# Patient Record
Sex: FEMALE | Race: ASIAN | NOT HISPANIC OR LATINO | URBAN - METROPOLITAN AREA
[De-identification: names, ages, dates, MRNs, and addresses within clinical notes are randomized per-mention and may not be internally consistent; named-entity substitution may affect disease eponyms.]

---

## 2017-09-24 ENCOUNTER — EMERGENCY (EMERGENCY)
Facility: HOSPITAL | Age: 37
LOS: 1 days | Discharge: HOME | End: 2017-09-24
Attending: EMERGENCY MEDICINE | Admitting: EMERGENCY MEDICINE
Payer: SELF-PAY

## 2017-09-24 VITALS
OXYGEN SATURATION: 100 % | HEART RATE: 64 BPM | DIASTOLIC BLOOD PRESSURE: 76 MMHG | RESPIRATION RATE: 16 BRPM | SYSTOLIC BLOOD PRESSURE: 107 MMHG

## 2017-09-24 LAB
ALBUMIN SERPL ELPH-MCNC: 4.3 G/DL — SIGNIFICANT CHANGE UP (ref 3.3–5)
ALP SERPL-CCNC: 32 U/L — LOW (ref 40–120)
ALT FLD-CCNC: 18 U/L — SIGNIFICANT CHANGE UP (ref 4–33)
AST SERPL-CCNC: 37 U/L — HIGH (ref 4–32)
BASOPHILS # BLD AUTO: 0.05 K/UL — SIGNIFICANT CHANGE UP (ref 0–0.2)
BASOPHILS NFR BLD AUTO: 0.8 % — SIGNIFICANT CHANGE UP (ref 0–2)
BASOPHILS NFR SPEC: 0 % — SIGNIFICANT CHANGE UP (ref 0–2)
BILIRUB SERPL-MCNC: 0.7 MG/DL — SIGNIFICANT CHANGE UP (ref 0.2–1.2)
BLASTS # FLD: 0 % — SIGNIFICANT CHANGE UP (ref 0–0)
BUN SERPL-MCNC: 7 MG/DL — SIGNIFICANT CHANGE UP (ref 7–23)
CALCIUM SERPL-MCNC: 9.1 MG/DL — SIGNIFICANT CHANGE UP (ref 8.4–10.5)
CHLORIDE SERPL-SCNC: 102 MMOL/L — SIGNIFICANT CHANGE UP (ref 98–107)
CO2 SERPL-SCNC: 21 MMOL/L — LOW (ref 22–31)
CREAT SERPL-MCNC: 0.69 MG/DL — SIGNIFICANT CHANGE UP (ref 0.5–1.3)
EOSINOPHIL # BLD AUTO: 0.15 K/UL — SIGNIFICANT CHANGE UP (ref 0–0.5)
EOSINOPHIL NFR BLD AUTO: 2.4 % — SIGNIFICANT CHANGE UP (ref 0–6)
EOSINOPHIL NFR FLD: 1.8 % — SIGNIFICANT CHANGE UP (ref 0–6)
GIANT PLATELETS BLD QL SMEAR: PRESENT — SIGNIFICANT CHANGE UP
GLUCOSE SERPL-MCNC: 82 MG/DL — SIGNIFICANT CHANGE UP (ref 70–99)
HCT VFR BLD CALC: 40.8 % — SIGNIFICANT CHANGE UP (ref 34.5–45)
HGB BLD-MCNC: 13.1 G/DL — SIGNIFICANT CHANGE UP (ref 11.5–15.5)
HYPOCHROMIA BLD QL: SLIGHT — SIGNIFICANT CHANGE UP
IMM GRANULOCYTES # BLD AUTO: 0.01 # — SIGNIFICANT CHANGE UP
IMM GRANULOCYTES NFR BLD AUTO: 0.2 % — SIGNIFICANT CHANGE UP (ref 0–1.5)
LYMPHOCYTES # BLD AUTO: 3.04 K/UL — SIGNIFICANT CHANGE UP (ref 1–3.3)
LYMPHOCYTES # BLD AUTO: 47.8 % — HIGH (ref 13–44)
LYMPHOCYTES NFR SPEC AUTO: 44.7 % — HIGH (ref 13–44)
MACROCYTES BLD QL: SIGNIFICANT CHANGE UP
MCHC RBC-ENTMCNC: 28.9 PG — SIGNIFICANT CHANGE UP (ref 27–34)
MCHC RBC-ENTMCNC: 32.1 % — SIGNIFICANT CHANGE UP (ref 32–36)
MCV RBC AUTO: 89.9 FL — SIGNIFICANT CHANGE UP (ref 80–100)
METAMYELOCYTES # FLD: 0 % — SIGNIFICANT CHANGE UP (ref 0–1)
MICROCYTES BLD QL: SLIGHT — SIGNIFICANT CHANGE UP
MONOCYTES # BLD AUTO: 0.38 K/UL — SIGNIFICANT CHANGE UP (ref 0–0.9)
MONOCYTES NFR BLD AUTO: 6 % — SIGNIFICANT CHANGE UP (ref 2–14)
MONOCYTES NFR BLD: 3.5 % — SIGNIFICANT CHANGE UP (ref 2–9)
MYELOCYTES NFR BLD: 0 % — SIGNIFICANT CHANGE UP (ref 0–0)
NEUTROPHIL AB SER-ACNC: 50 % — SIGNIFICANT CHANGE UP (ref 43–77)
NEUTROPHILS # BLD AUTO: 2.73 K/UL — SIGNIFICANT CHANGE UP (ref 1.8–7.4)
NEUTROPHILS NFR BLD AUTO: 42.8 % — LOW (ref 43–77)
NEUTS BAND # BLD: 0 % — SIGNIFICANT CHANGE UP (ref 0–6)
NRBC # FLD: 0 — SIGNIFICANT CHANGE UP
OTHER - HEMATOLOGY %: 0 — SIGNIFICANT CHANGE UP
OVALOCYTES BLD QL SMEAR: SLIGHT — SIGNIFICANT CHANGE UP
PLATELET # BLD AUTO: 143 K/UL — LOW (ref 150–400)
PLATELET COUNT - ESTIMATE: SIGNIFICANT CHANGE UP
PMV BLD: 12.7 FL — SIGNIFICANT CHANGE UP (ref 7–13)
POTASSIUM SERPL-MCNC: SIGNIFICANT CHANGE UP MMOL/L (ref 3.5–5.3)
POTASSIUM SERPL-SCNC: SIGNIFICANT CHANGE UP MMOL/L (ref 3.5–5.3)
PROMYELOCYTES # FLD: 0 % — SIGNIFICANT CHANGE UP (ref 0–0)
PROT SERPL-MCNC: 7.7 G/DL — SIGNIFICANT CHANGE UP (ref 6–8.3)
RBC # BLD: 4.54 M/UL — SIGNIFICANT CHANGE UP (ref 3.8–5.2)
RBC # FLD: 12.5 % — SIGNIFICANT CHANGE UP (ref 10.3–14.5)
SODIUM SERPL-SCNC: 138 MMOL/L — SIGNIFICANT CHANGE UP (ref 135–145)
TSH SERPL-MCNC: 2.2 UIU/ML — SIGNIFICANT CHANGE UP (ref 0.27–4.2)
VARIANT LYMPHS # BLD: 0 % — SIGNIFICANT CHANGE UP
WBC # BLD: 6.36 K/UL — SIGNIFICANT CHANGE UP (ref 3.8–10.5)
WBC # FLD AUTO: 6.36 K/UL — SIGNIFICANT CHANGE UP (ref 3.8–10.5)

## 2017-09-24 PROCEDURE — 93971 EXTREMITY STUDY: CPT | Mod: 26,LT

## 2017-09-24 PROCEDURE — 99284 EMERGENCY DEPT VISIT MOD MDM: CPT

## 2017-09-24 NOTE — ED PROVIDER NOTE - ATTENDING CONTRIBUTION TO CARE
Edwards: Pt's history obtained in the patients own language as a native speaker.  Pt c/o one year of fatigue, worsening for 6 months and worse over few weeks since starting job at [employer].  Pt also noted right leg intermittent swelling that resolves with elevation when working.  +weight loss of 20 lbs over 1 year.  No other positives on ROS.  On exam, pt is well appearing in absolutely no distress, clear lungs, cardiac normal, abd soft, non tender, no pitting edema noted now, enuro and gait normal.  Pt has no medical care so will do some basics but pt's sxs will require long term care.  US ordered to r/o dvt.

## 2017-09-24 NOTE — ED PROVIDER NOTE - MUSCULOSKELETAL MINIMAL EXAM
pt has tenderness with palpation of the calf, pain upon dorsiflexion of calf, strength 5/5 maintained, no sensory deficits, no obvious deformities, no leg swelling appreciated/normal range of motion/TENDERNESS/motor intact

## 2017-09-24 NOTE — ED ADULT NURSE NOTE - OBJECTIVE STATEMENT
received pt to intake room 2 for evaluation of right leg pain x 1 week, denies injury or trauma, recent travel or long car rides. pt presents awake a&ox4, non-english speaking, with family at bedside to translate. denies dizziness or ha. skin warm, dry, appropriate for race. no swelling noted to b/l le. respirations even unlabored. denies cp or sob. abdomen soft nontender nondistended. denies n/v/d denies fever or chills. bloods drawn and sent. family at bedside. awaiting ultrasound.

## 2017-09-24 NOTE — ED PROVIDER NOTE - MEDICAL DECISION MAKING DETAILS
38 yo F 38 yo F no pmh right leg pain acute on chronic, subjective unilateral swelling. Describes pain more with walking/standing at work over last 6 mo. Exam with no edema but pain of the right calf and +homans sign. Pt moved from [place], no PMD over last 6 years also describing general malaise and weight loss. Will r/o dvt with US though low suspicion, no risk factors. Will also draw basic labs. Spoke to family at length about need for pcp and routine f/u.   Maliha Haskins, PGY-1 EM

## 2017-09-24 NOTE — ED PROVIDER NOTE - OBJECTIVE STATEMENT
Pt is a 36 y/o F no pmh, no surgical hx, c/o right leg pain acute on chronic. Pt originally from [place], translated by friend at bedside. Pt reports she has not seen a pmd since being in the [place] over the last 6 years. Recently started working at [employer] and has increasing leg pain since that time, worse with standing/walking. Endorses unilateral leg swelling. Also reports generalized weakness/malaise chronically over the last year. +weight loss, unsure the amount but reports it to be significant. Pt denies flu-like sx including fever, chest pain, shortness of breath, headache/dizziness, or any other concerns. Non-smoker, no ETOH, no prior pregnancies. No primary care visit within the last 6 years. No risk factors for blood clot or prior hx of blood clot.

## 2017-09-24 NOTE — ED PROVIDER NOTE - PROGRESS NOTE DETAILS
pt pending labs and US results  - all of which were unremarkable.  Per sign out plan will dc home with PMD follow up and copy of all results - SILVER

## 2020-05-08 NOTE — ED ADULT NURSE NOTE - TEMPLATE
Patient's chart was reviewed.   Requested updates within Care Everywhere.  Immunizations reconciled.    Health Maintenance was updated.     General

## 2021-04-20 ENCOUNTER — APPOINTMENT (OUTPATIENT)
Age: 41
End: 2021-04-20
Payer: COMMERCIAL

## 2021-04-20 PROCEDURE — 0001A: CPT

## 2021-05-11 ENCOUNTER — APPOINTMENT (OUTPATIENT)
Age: 41
End: 2021-05-11

## 2021-05-20 ENCOUNTER — APPOINTMENT (OUTPATIENT)
Age: 41
End: 2021-05-20